# Patient Record
Sex: FEMALE | Race: WHITE | NOT HISPANIC OR LATINO | Employment: PART TIME | URBAN - NONMETROPOLITAN AREA
[De-identification: names, ages, dates, MRNs, and addresses within clinical notes are randomized per-mention and may not be internally consistent; named-entity substitution may affect disease eponyms.]

---

## 2023-01-12 ENCOUNTER — HOSPITAL ENCOUNTER (EMERGENCY)
Facility: HOSPITAL | Age: 63
Discharge: HOME | End: 2023-01-12
Attending: EMERGENCY MEDICINE

## 2023-01-12 VITALS
SYSTOLIC BLOOD PRESSURE: 135 MMHG | RESPIRATION RATE: 16 BRPM | HEART RATE: 75 BPM | DIASTOLIC BLOOD PRESSURE: 80 MMHG | OXYGEN SATURATION: 100 % | WEIGHT: 146 LBS | TEMPERATURE: 97.2 F

## 2023-01-12 DIAGNOSIS — R05.9 COUGH: ICD-10-CM

## 2023-01-12 DIAGNOSIS — R07.9 CHEST PAIN: ICD-10-CM

## 2023-01-12 DIAGNOSIS — B34.9 VIRAL SYNDROME: Primary | ICD-10-CM

## 2023-01-12 LAB
ALBUMIN SERPL BCP-MCNC: 3.6 G/DL (ref 3.5–5)
ALP SERPL-CCNC: 67 U/L (ref 46–116)
ALT SERPL W P-5'-P-CCNC: 18 U/L (ref 12–78)
ANION GAP SERPL CALCULATED.3IONS-SCNC: 8 MMOL/L (ref 4–13)
AST SERPL W P-5'-P-CCNC: 18 U/L (ref 5–45)
BASOPHILS # BLD AUTO: 0.07 THOUSANDS/ÂΜL (ref 0–0.1)
BASOPHILS NFR BLD AUTO: 1 % (ref 0–1)
BILIRUB SERPL-MCNC: 0.35 MG/DL (ref 0.2–1)
BUN SERPL-MCNC: 12 MG/DL (ref 5–25)
CALCIUM SERPL-MCNC: 9.1 MG/DL (ref 8.3–10.1)
CARDIAC TROPONIN I PNL SERPL HS: 2 NG/L
CHLORIDE SERPL-SCNC: 105 MMOL/L (ref 96–108)
CO2 SERPL-SCNC: 27 MMOL/L (ref 21–32)
CREAT SERPL-MCNC: 0.88 MG/DL (ref 0.6–1.3)
D DIMER PPP FEU-MCNC: 0.36 UG/ML FEU
EOSINOPHIL # BLD AUTO: 0.21 THOUSAND/ÂΜL (ref 0–0.61)
EOSINOPHIL NFR BLD AUTO: 2 % (ref 0–6)
ERYTHROCYTE [DISTWIDTH] IN BLOOD BY AUTOMATED COUNT: 13.3 % (ref 11.6–15.1)
GFR SERPL CREATININE-BSD FRML MDRD: 70 ML/MIN/1.73SQ M
GLUCOSE SERPL-MCNC: 92 MG/DL (ref 65–140)
HCT VFR BLD AUTO: 36.9 % (ref 34.8–46.1)
HGB BLD-MCNC: 11.7 G/DL (ref 11.5–15.4)
IMM GRANULOCYTES # BLD AUTO: 0.04 THOUSAND/UL (ref 0–0.2)
IMM GRANULOCYTES NFR BLD AUTO: 0 % (ref 0–2)
LYMPHOCYTES # BLD AUTO: 2.18 THOUSANDS/ÂΜL (ref 0.6–4.47)
LYMPHOCYTES NFR BLD AUTO: 19 % (ref 14–44)
MCH RBC QN AUTO: 27.7 PG (ref 26.8–34.3)
MCHC RBC AUTO-ENTMCNC: 31.7 G/DL (ref 31.4–37.4)
MCV RBC AUTO: 87 FL (ref 82–98)
MONOCYTES # BLD AUTO: 1.12 THOUSAND/ÂΜL (ref 0.17–1.22)
MONOCYTES NFR BLD AUTO: 10 % (ref 4–12)
NEUTROPHILS # BLD AUTO: 7.88 THOUSANDS/ÂΜL (ref 1.85–7.62)
NEUTS SEG NFR BLD AUTO: 68 % (ref 43–75)
NRBC BLD AUTO-RTO: 0 /100 WBCS
NT-PROBNP SERPL-MCNC: 104 PG/ML
PLATELET # BLD AUTO: 344 THOUSANDS/UL (ref 149–390)
PMV BLD AUTO: 9.3 FL (ref 8.9–12.7)
POTASSIUM SERPL-SCNC: 3.9 MMOL/L (ref 3.5–5.3)
PROT SERPL-MCNC: 7 G/DL (ref 6.4–8.4)
RBC # BLD AUTO: 4.23 MILLION/UL (ref 3.81–5.12)
SODIUM SERPL-SCNC: 140 MMOL/L (ref 135–147)
WBC # BLD AUTO: 11.5 THOUSAND/UL (ref 4.31–10.16)

## 2023-01-12 NOTE — Clinical Note
Elvia De La Fuente was seen and treated in our emergency department on 1/12/2023  Diagnosis:     Reji Colby  may return to work on return date  She may return on this date: 01/13/2023         If you have any questions or concerns, please don't hesitate to call        Lawrence Clarke MD    ______________________________           _______________          _______________  Hospital Representative                              Date                                Time

## 2023-01-12 NOTE — DISCHARGE INSTRUCTIONS
Thank you for letting us take care of you  You have been evaluated for chest pain and cough  Please continue using ibuprofen and Tylenol  Please follow-up with your primary care physician  Please return if you have worsening symptoms  At this time, you have no clinical evidence of symptoms or problems that will require hospitalization, however you should be evaluated soon by a primary care physician, and contact information has been provided  Follow up with your primary care physician  This is important as many medical conditions can be managed as an outpatient, in addition to routine health screening  Seeing your primary doctor often can help identify changes in the medical issue that brought you to the ED for care today  If you experience any new symptoms or acute worsening of current symptoms, please return to the ED

## 2023-01-12 NOTE — ED PROVIDER NOTES
History  Chief Complaint   Patient presents with   • Chest Pain     With deep inspiration and cough, states she had been coughing for 2 days  Was seen at urgent care [place] and sent in for evaluation because "he said you can not see pneumonia on an xray so wanted further testing "      66-year-old female with past medical history pertinent for appendectomy, hysterectomy, cholecystectomy who presents to the emergency department after patient was seen at urgent care just prior to arrival reporting that she has had chest pain with deep inspiration and cough for the last 2 days  States that she was sent here because "he cannot see pneumonia on her chest x-ray and wants further testing "  Patient however did also report right-sided pain that is worse with coughing  Patient states she does not have diabetes, hypertension or any dyslipidemia  Reports no family history or any personal history of any cardiac events  Reports pain is on the right side and worse with coughing  Did not use any Tylenol or ibuprofen and does not wish to have any at this time  Does not wish to be tested for any viruses at this time as she reports she already had COVID and flu testing at urgent care just prior to arrival that was negative  Reports that he does get some shortness of breath at times but not currently  Denies any previous blood clots, estrogen use  No other concerns at this time  None       History reviewed  No pertinent past medical history  Past Surgical History:   Procedure Laterality Date   • APPENDECTOMY     • GALLBLADDER SURGERY     • HYSTERECTOMY         History reviewed  No pertinent family history  I have reviewed and agree with the history as documented      E-Cigarette/Vaping   • E-Cigarette Use Never User      E-Cigarette/Vaping Substances     Social History     Tobacco Use   • Smoking status: Never   • Smokeless tobacco: Never   Vaping Use   • Vaping Use: Never used   Substance Use Topics   • Alcohol use: Never   • Drug use: Never       Review of Systems   Constitutional: Negative for activity change, appetite change, chills and fever  HENT: Positive for sore throat  Negative for congestion, facial swelling and rhinorrhea  Eyes: Negative for visual disturbance  Respiratory: Positive for cough  Negative for apnea, choking, chest tightness, shortness of breath, wheezing and stridor  Cardiovascular: Positive for chest pain  Negative for palpitations and leg swelling  Gastrointestinal: Negative for abdominal pain, constipation, diarrhea, nausea and vomiting  Genitourinary: Negative for dysuria, flank pain and pelvic pain  Musculoskeletal: Negative for back pain and neck pain  Skin: Negative for rash  Neurological: Negative for weakness, numbness and headaches  Psychiatric/Behavioral: Negative for behavioral problems  Physical Exam  Physical Exam  Vitals and nursing note reviewed  Constitutional:       General: She is not in acute distress  Appearance: She is well-developed  She is not diaphoretic  HENT:      Head: Normocephalic and atraumatic  Right Ear: External ear normal       Left Ear: External ear normal       Nose: Nose normal    Eyes:      Pupils: Pupils are equal, round, and reactive to light  Cardiovascular:      Rate and Rhythm: Normal rate and regular rhythm  Heart sounds: Normal heart sounds  Pulmonary:      Effort: Pulmonary effort is normal  No respiratory distress  Breath sounds: Normal breath sounds  No decreased breath sounds, wheezing or rales  Chest:      Chest wall: No mass, deformity, tenderness, crepitus or edema  Abdominal:      General: Bowel sounds are normal       Palpations: Abdomen is soft  Tenderness: There is no abdominal tenderness  Musculoskeletal:         General: No tenderness or deformity  Normal range of motion  Cervical back: Normal range of motion and neck supple  Skin:     General: Skin is warm and dry  Capillary Refill: Capillary refill takes less than 2 seconds  Neurological:      Mental Status: She is alert and oriented to person, place, and time  Motor: No abnormal muscle tone  Vital Signs  ED Triage Vitals [01/12/23 1013]   Temperature Pulse Respirations Blood Pressure SpO2   (!) 97 2 °F (36 2 °C) 75 16 135/80 100 %      Temp Source Heart Rate Source Patient Position - Orthostatic VS BP Location FiO2 (%)   Temporal Monitor Sitting Right arm --      Pain Score       --           Vitals:    01/12/23 1013   BP: 135/80   Pulse: 75   Patient Position - Orthostatic VS: Sitting         Visual Acuity      ED Medications  Medications - No data to display    Diagnostic Studies  Results Reviewed     Procedure Component Value Units Date/Time    HS Troponin I 4hr [[id number]]     Lab Status: No result Specimen: Blood     D-dimer, quantitative [[id number]]  (Normal) Collected: 01/12/23 1054    Lab Status: Final result Specimen: Blood from Arm, Left Updated: 01/12/23 1145     D-Dimer, Quant 0 36 ug/ml FEU     Narrative: In the evaluation for possible pulmonary embolism, in the appropriate (Well's Score of 4 or less) patient, the age adjusted d-dimer cutoff for this patient can be calculated as:    Age x 0 01 (in ug/mL) for Age-adjusted D-dimer exclusion threshold for a patient over 50 years      NT-BNP PRO [[id number]]  (Normal) Collected: 01/12/23 1054    Lab Status: Final result Specimen: Blood from Arm, Left Updated: 01/12/23 1124     NT-proBNP 104 pg/mL     Comprehensive metabolic panel [[id number]] Collected: 01/12/23 1054    Lab Status: Final result Specimen: Blood from Arm, Left Updated: 01/12/23 1124     Sodium 140 mmol/L      Potassium 3 9 mmol/L      Chloride 105 mmol/L      CO2 27 mmol/L      ANION GAP 8 mmol/L      BUN 12 mg/dL      Creatinine 0 88 mg/dL      Glucose 92 mg/dL      Calcium 9 1 mg/dL      AST 18 U/L      ALT 18 U/L      Alkaline Phosphatase 67 U/L      Total Protein 7 0 g/dL Albumin 3 6 g/dL      Total Bilirubin 0 35 mg/dL      eGFR 70 ml/min/1 73sq m     Narrative:      [employer] guidelines for Chronic Kidney Disease (CKD):   •  Stage 1 with normal or high GFR (GFR > 90 mL/min/1 73 square meters)  •  Stage 2 Mild CKD (GFR = 60-89 mL/min/1 73 square meters)  •  Stage 3A Moderate CKD (GFR = 45-59 mL/min/1 73 square meters)  •  Stage 3B Moderate CKD (GFR = 30-44 mL/min/1 73 square meters)  •  Stage 4 Severe CKD (GFR = 15-29 mL/min/1 73 square meters)  •  Stage 5 End Stage CKD (GFR <15 mL/min/1 73 square meters)  Note: GFR calculation is accurate only with a steady state creatinine    HS Troponin 0hr (reflex protocol) [[id number]]  (Normal) Collected: 01/12/23 1054    Lab Status: Final result Specimen: Blood from Arm, Left Updated: 01/12/23 1124     hs TnI 0hr 2 ng/L     HS Troponin I 2hr [[id number]]     Lab Status: No result Specimen: Blood     CBC and differential [[id number]]  (Abnormal) Collected: 01/12/23 1054    Lab Status: Final result Specimen: Blood from Arm, Left Updated: 01/12/23 1105     WBC 11 50 Thousand/uL      RBC 4 23 Million/uL      Hemoglobin 11 7 g/dL      Hematocrit 36 9 %      MCV 87 fL      MCH 27 7 pg      MCHC 31 7 g/dL      RDW 13 3 %      MPV 9 3 fL      Platelets 059 Thousands/uL      nRBC 0 /100 WBCs      Neutrophils Relative 68 %      Immat GRANS % 0 %      Lymphocytes Relative 19 %      Monocytes Relative 10 %      Eosinophils Relative 2 %      Basophils Relative 1 %      Neutrophils Absolute 7 88 Thousands/µL      Immature Grans Absolute 0 04 Thousand/uL      Lymphocytes Absolute 2 18 Thousands/µL      Monocytes Absolute 1 12 Thousand/µL      Eosinophils Absolute 0 21 Thousand/µL      Basophils Absolute 0 07 Thousands/µL                  No orders to display              Procedures  ECG 12 Lead Documentation Only    Date/Time: 1/12/2023 10:52 AM  Performed by: Papito Khan MD  Authorized by: Papito Khan MD     ECG reviewed by me, the ED Provider: yes    Patient location:  ED  Previous ECG:     Previous ECG:  Unavailable  Interpretation:     Interpretation: normal    Rate:     ECG rate:  68    ECG rate assessment: normal    Rhythm:     Rhythm: sinus rhythm    Ectopy:     Ectopy: none    QRS:     QRS axis:  Normal  Conduction:     Conduction: normal    ST segments:     ST segments:  Normal  T waves:     T waves: normal               ED Course            RESULTS:  Results Reviewed     Procedure Component Value Units Date/Time    HS Troponin I 4hr [[id number]]     Lab Status: No result Specimen: Blood     D-dimer, quantitative [[id number]]  (Normal) Collected: 01/12/23 1054    Lab Status: Final result Specimen: Blood from Arm, Left Updated: 01/12/23 1145     D-Dimer, Quant 0 36 ug/ml FEU     Narrative: In the evaluation for possible pulmonary embolism, in the appropriate (Well's Score of 4 or less) patient, the age adjusted d-dimer cutoff for this patient can be calculated as:    Age x 0 01 (in ug/mL) for Age-adjusted D-dimer exclusion threshold for a patient over 50 years      NT-BNP PRO [[id number]]  (Normal) Collected: 01/12/23 1054    Lab Status: Final result Specimen: Blood from Arm, Left Updated: 01/12/23 1124     NT-proBNP 104 pg/mL     Comprehensive metabolic panel [[id number]] Collected: 01/12/23 1054    Lab Status: Final result Specimen: Blood from Arm, Left Updated: 01/12/23 1124     Sodium 140 mmol/L      Potassium 3 9 mmol/L      Chloride 105 mmol/L      CO2 27 mmol/L      ANION GAP 8 mmol/L      BUN 12 mg/dL      Creatinine 0 88 mg/dL      Glucose 92 mg/dL      Calcium 9 1 mg/dL      AST 18 U/L      ALT 18 U/L      Alkaline Phosphatase 67 U/L      Total Protein 7 0 g/dL      Albumin 3 6 g/dL      Total Bilirubin 0 35 mg/dL      eGFR 70 ml/min/1 73sq m     Narrative:      [employer] guidelines for Chronic Kidney Disease (CKD):   •  Stage 1 with normal or high GFR (GFR > 90 mL/min/1 73 square meters)  •  Stage 2 Mild CKD (GFR = 60-89 mL/min/1 73 square meters)  •  Stage 3A Moderate CKD (GFR = 45-59 mL/min/1 73 square meters)  •  Stage 3B Moderate CKD (GFR = 30-44 mL/min/1 73 square meters)  •  Stage 4 Severe CKD (GFR = 15-29 mL/min/1 73 square meters)  •  Stage 5 End Stage CKD (GFR <15 mL/min/1 73 square meters)  Note: GFR calculation is accurate only with a steady state creatinine    HS Troponin 0hr (reflex protocol) [[id number]]  (Normal) Collected: 01/12/23 1054    Lab Status: Final result Specimen: Blood from Arm, Left Updated: 01/12/23 1124     hs TnI 0hr 2 ng/L     HS Troponin I 2hr [[id number]]     Lab Status: No result Specimen: Blood     CBC and differential [[id number]]  (Abnormal) Collected: 01/12/23 1054    Lab Status: Final result Specimen: Blood from Arm, Left Updated: 01/12/23 1105     WBC 11 50 Thousand/uL      RBC 4 23 Million/uL      Hemoglobin 11 7 g/dL      Hematocrit 36 9 %      MCV 87 fL      MCH 27 7 pg      MCHC 31 7 g/dL      RDW 13 3 %      MPV 9 3 fL      Platelets 233 Thousands/uL      nRBC 0 /100 WBCs      Neutrophils Relative 68 %      Immat GRANS % 0 %      Lymphocytes Relative 19 %      Monocytes Relative 10 %      Eosinophils Relative 2 %      Basophils Relative 1 %      Neutrophils Absolute 7 88 Thousands/µL      Immature Grans Absolute 0 04 Thousand/uL      Lymphocytes Absolute 2 18 Thousands/µL      Monocytes Absolute 1 12 Thousand/µL      Eosinophils Absolute 0 21 Thousand/µL      Basophils Absolute 0 07 Thousands/µL           No orders to display       Vitals:    01/12/23 1013   BP: 135/80   TempSrc: Temporal   Pulse: 75   Resp: 16   Patient Position - Orthostatic VS: Sitting   Temp: (!) 97 2 °F (36 2 °C)           Medical Decision Making  59-year-old female with past medical history pertinent for appendectomy, hysterectomy, cholecystectomy who presents to the emergency department after patient was seen at urgent care just prior to arrival reporting that she has had chest pain with deep inspiration and cough for the last 2 days  Vital signs on arrival here are nonconcerning  Patient had exam as above  Already had x-ray obtained which showed no concerns  EKG obtained on arrival here which showed no arrhythmia or no signs of ischemia  Lab work obtained to evaluate for any signs of ACS, CHF or any PE  Results of lab work returned showing troponin of 2, BNP of 104, and D-dimer of 0 36  Otherwise lab work showed minimal leukocytosis of 11 5  HEART score noted to be 2  Patient declined any Tylenol or ibuprofen here for her likely costochondritis from coughing  Patient was advised NSAIDs and Tylenol at home  Patient was advised follow-up with PCP and return for worsening symptoms  Chest pain: acute illness or injury  Cough: acute illness or injury  Viral syndrome: acute illness or injury  Amount and/or Complexity of Data Reviewed  Labs: ordered  Risk  OTC drugs  Disposition  Final diagnoses:   Viral syndrome   Cough   Chest pain     Time reflects when diagnosis was documented in both MDM as applicable and the Disposition within this note     Time User Action Codes Description Comment    1/12/2023 10:55 AM Kamille ZAIDI Add [B34 9] Viral syndrome     1/12/2023 10:55 AM Kamille Fairbanksrice N Add [R05 9] Cough     1/12/2023 10:55 AM Mika Quan Add [R07 9] Chest pain       ED Disposition     ED Disposition   Discharge    Condition   Stable    Date/Time   Thu Jan 12, 2023 10:55 AM    Comment   Annetta Flurry discharge to home/self care  Follow-up Information     Follow up With Specialties Details Why Contact Info    Your primary care physician              Patient's Medications    No medications on file       No discharge procedures on file      PDMP Review     None          ED Provider  Electronically Signed by           Michelle Arvizu MD  01/12/23 9013

## 2023-01-13 LAB
ATRIAL RATE: 68 BPM
P AXIS: 63 DEGREES
PR INTERVAL: 166 MS
QRS AXIS: 26 DEGREES
QRSD INTERVAL: 82 MS
QT INTERVAL: 386 MS
QTC INTERVAL: 410 MS
T WAVE AXIS: 43 DEGREES
VENTRICULAR RATE: 68 BPM